# Patient Record
Sex: FEMALE | Race: WHITE | NOT HISPANIC OR LATINO | Employment: OTHER | URBAN - METROPOLITAN AREA
[De-identification: names, ages, dates, MRNs, and addresses within clinical notes are randomized per-mention and may not be internally consistent; named-entity substitution may affect disease eponyms.]

---

## 2017-03-14 ENCOUNTER — OFFICE VISIT (OUTPATIENT)
Dept: PODIATRY | Facility: CLINIC | Age: 77
End: 2017-03-14
Payer: MEDICARE

## 2017-03-14 VITALS — BODY MASS INDEX: 33.69 KG/M2 | WEIGHT: 197.31 LBS | HEIGHT: 64 IN

## 2017-03-14 DIAGNOSIS — L60.2 ONYCHAUXIS: Primary | ICD-10-CM

## 2017-03-14 PROCEDURE — 99999 PR PBB SHADOW E&M-EST. PATIENT-LVL II: CPT | Mod: PBBFAC,,,

## 2017-03-14 PROCEDURE — 17999 UNLISTD PX SKN MUC MEMB SUBQ: CPT | Mod: CSM,,,

## 2017-03-14 PROCEDURE — 99499 UNLISTED E&M SERVICE: CPT | Mod: CSM,,,

## 2017-06-12 ENCOUNTER — TELEPHONE (OUTPATIENT)
Dept: PODIATRY | Facility: CLINIC | Age: 77
End: 2017-06-12

## 2017-06-12 NOTE — TELEPHONE ENCOUNTER
----- Message from Marychuy Fuentes sent at 6/12/2017  8:02 AM CDT -----  Patient requesting to reschedule tomorrow's procedure appointment/please call back at [phone] to reschedule with patient.

## 2017-07-06 ENCOUNTER — OFFICE VISIT (OUTPATIENT)
Dept: PODIATRY | Facility: CLINIC | Age: 77
End: 2017-07-06

## 2017-07-06 VITALS — WEIGHT: 172.81 LBS | BODY MASS INDEX: 29.5 KG/M2 | HEIGHT: 64 IN

## 2017-07-06 DIAGNOSIS — L57.0 KERATOMA: ICD-10-CM

## 2017-07-06 DIAGNOSIS — L60.2 ONYCHAUXIS: Primary | ICD-10-CM

## 2017-07-06 PROCEDURE — 99499 UNLISTED E&M SERVICE: CPT | Mod: S$GLB,,,

## 2017-07-06 PROCEDURE — 17999 UNLISTD PX SKN MUC MEMB SUBQ: CPT | Mod: S$GLB,,,

## 2017-07-06 PROCEDURE — 99999 PR PBB SHADOW E&M-EST. PATIENT-LVL III: CPT | Mod: PBBFAC,,,

## 2017-07-07 NOTE — PROGRESS NOTES
Pt presents for routine non-covered foot care. Pt. does not have high risk feet. Pedal pulses are palpable. Nails are elongated, thickened Bilaterally. Diagnosis is onychauxis/keratoma. Nails/calluses were reduced Bilaterally. Patient tolerated well and related relief. RTC p.r.n. as PROC B

## 2019-06-20 ENCOUNTER — OFFICE VISIT (OUTPATIENT)
Dept: URGENT CARE | Facility: CLINIC | Age: 79
End: 2019-06-20
Payer: MEDICARE

## 2019-06-20 VITALS
HEIGHT: 64 IN | RESPIRATION RATE: 16 BRPM | HEART RATE: 66 BPM | WEIGHT: 172 LBS | TEMPERATURE: 98 F | SYSTOLIC BLOOD PRESSURE: 129 MMHG | DIASTOLIC BLOOD PRESSURE: 79 MMHG | BODY MASS INDEX: 29.37 KG/M2 | OXYGEN SATURATION: 95 %

## 2019-06-20 DIAGNOSIS — L25.9 CONTACT DERMATITIS, UNSPECIFIED CONTACT DERMATITIS TYPE, UNSPECIFIED TRIGGER: Primary | ICD-10-CM

## 2019-06-20 PROCEDURE — 99203 PR OFFICE/OUTPT VISIT, NEW, LEVL III, 30-44 MIN: ICD-10-PCS | Mod: S$GLB,,, | Performed by: NURSE PRACTITIONER

## 2019-06-20 PROCEDURE — 99203 OFFICE O/P NEW LOW 30 MIN: CPT | Mod: S$GLB,,, | Performed by: NURSE PRACTITIONER

## 2019-06-20 RX ORDER — TRIAMCINOLONE ACETONIDE 1 MG/G
CREAM TOPICAL 2 TIMES DAILY
Qty: 1 TUBE | Refills: 0 | Status: SHIPPED | OUTPATIENT
Start: 2019-06-20 | End: 2019-07-30

## 2019-06-20 RX ORDER — PREDNISONE 10 MG/1
TABLET ORAL
Qty: 30 TABLET | Refills: 0 | Status: SHIPPED | OUTPATIENT
Start: 2019-06-20 | End: 2019-07-30

## 2019-06-20 NOTE — PATIENT INSTRUCTIONS
Follow up with your doctor in a few days.  Return to the urgent care or go to the ER if symptoms get worse.     Allergic Reaction /Contact Dermatitis  If your condition worsens or fails to improve we recommend that you receive another evaluation at the ER immediately or contact your PCP to discuss your concerns or return here. You must understand that you've received an urgent care treatment only and that you may be released before all your medical problems are known or treated. You the patient will arrange for followup care as instructed.     Zyrtec, Claritin or Allegra should be used daily for the next 5-7 days to prevent or suppress the itching. You can take Benedryl 25 mg at bedtime as well.     START ORAL STEROIDS TOMORROW: 30MG DAILY X 5 DAYS, THEN 20MG DAILY X 5 DAYS, THEN 10MG DAILY X 5 DAYS.     STEROID CREAM AS DIRECTED FOR ITCHING-AVOID FACE.  COOL COMPRESS TO AREAS TO HELP WITH RELIEF.    If you develop additional symptoms such as tongue swelling or trouble breathing go immediately to the ER.

## 2019-06-20 NOTE — PROGRESS NOTES
"Subjective:       Patient ID: Shantal Quinn is a 78 y.o. female.    Vitals:  height is 5' 4" (1.626 m) and weight is 78 kg (172 lb). Her oral temperature is 98.1 °F (36.7 °C). Her blood pressure is 129/79 and her pulse is 66. Her respiration is 16 and oxygen saturation is 95%.     Chief Complaint: Rash (Pt states that she has a rash on her face, arm, and foot.  States that it itches really bad.  Also states that noticed it on Sunday, yesterday her face started to swell. )    Pt states that she has a rash on her face, arm, and foot.  States that it itches really bad.  Also states that noticed it on Sunday, yesterday her face started to swell.  WORKING IN [employer]- NO KNOWN CONTACT WITH [employer], [employer].     Rash   This is a new problem. The current episode started in the past 7 days. The problem is unchanged. The affected locations include the face, right foot, left arm and right arm. She was exposed to nothing. Pertinent negatives include no cough, fever or sore throat. Past treatments include nothing. The treatment provided no relief.       Constitution: Negative for chills and fever.   HENT: Negative for facial swelling and sore throat.    Neck: Negative for painful lymph nodes.   Eyes: Negative for eye itching and eyelid swelling.   Respiratory: Negative for cough.    Musculoskeletal: Negative for joint pain and joint swelling.   Skin: Positive for rash. Negative for color change, pale, wound, abrasion, laceration, lesion, skin thickening/induration, puncture wound, erythema, bruising, abscess, avulsion and hives.   Allergic/Immunologic: Negative for environmental allergies, immunocompromised state and hives.   Hematologic/Lymphatic: Negative for swollen lymph nodes.       Objective:      Physical Exam   Constitutional: She is oriented to person, place, and time. She appears well-developed and well-nourished.   HENT:   Head: Normocephalic and atraumatic. Head is without abrasion, without contusion and " without laceration.   Right Ear: External ear normal.   Left Ear: External ear normal.   Nose: Nose normal.   Mouth/Throat: Oropharynx is clear and moist.   Eyes: Pupils are equal, round, and reactive to light. Conjunctivae, EOM and lids are normal.   Neck: Trachea normal, full passive range of motion without pain and phonation normal. Neck supple.   Cardiovascular: Normal rate, regular rhythm and normal heart sounds.   Pulmonary/Chest: Effort normal and breath sounds normal. No stridor. No respiratory distress.   Musculoskeletal: Normal range of motion.   Neurological: She is alert and oriented to person, place, and time.   Skin: Skin is warm, dry and intact. Capillary refill takes less than 2 seconds. Rash noted. No abrasion, no bruising, no burn, no ecchymosis, no laceration and no lesion noted. Rash is maculopapular and vesicular. No erythema.        RASH TO RIGHT CHIN/NECK, BILATERAL ue, RIGHT FOOT WITH TRACKING LINEAR PATTERN, NO TTP   Psychiatric: She has a normal mood and affect. Her speech is normal and behavior is normal. Judgment and thought content normal. Cognition and memory are normal.   Nursing note and vitals reviewed.      Assessment:       1. Contact dermatitis, unspecified contact dermatitis type, unspecified trigger        Plan:         Contact dermatitis, unspecified contact dermatitis type, unspecified trigger  -     predniSONE (DELTASONE) 10 MG tablet; Take 30mg ( 3tab) daily x 5 days, then 20mg (2tab) daily x 5 days, then 10mg (1tab) daily x 5 days  Dispense: 30 tablet; Refill: 0  -     triamcinolone acetonide 0.1% (KENALOG) 0.1 % cream; Apply topically 2 (two) times daily. for 7 days  Dispense: 1 Tube; Refill: 0      Patient Instructions   Follow up with your doctor in a few days.  Return to the urgent care or go to the ER if symptoms get worse.     Allergic Reaction /Contact Dermatitis  If your condition worsens or fails to improve we recommend that you receive another evaluation at the ER  immediately or contact your PCP to discuss your concerns or return here. You must understand that you've received an urgent care treatment only and that you may be released before all your medical problems are known or treated. You the patient will arrange for followup care as instructed.     Zyrtec, Claritin or Allegra should be used daily for the next 5-7 days to prevent or suppress the itching. You can take Benedryl 25 mg at bedtime as well.     START ORAL STEROIDS TOMORROW: 30MG DAILY X 5 DAYS, THEN 20MG DAILY X 5 DAYS, THEN 10MG DAILY X 5 DAYS.     STEROID CREAM AS DIRECTED FOR ITCHING-AVOID FACE.  COOL COMPRESS TO AREAS TO HELP WITH RELIEF.    If you develop additional symptoms such as tongue swelling or trouble breathing go immediately to the ER.

## 2021-07-26 PROBLEM — E78.5 HYPERLIPIDEMIA: Status: ACTIVE | Noted: 2021-07-26

## 2021-07-26 PROBLEM — I10 ESSENTIAL HYPERTENSION: Status: ACTIVE | Noted: 2021-07-26

## 2023-05-24 PROBLEM — Z91.89 INCREASED RISK OF BREAST CANCER: Status: ACTIVE | Noted: 2023-05-24

## 2023-05-24 PROBLEM — D24.2 INTRADUCTAL PAPILLOMA OF LEFT BREAST: Status: ACTIVE | Noted: 2023-05-24

## 2023-05-24 PROBLEM — D36.9 DUCTAL PAPILLOMA: Status: RESOLVED | Noted: 2023-05-24 | Resolved: 2023-05-24

## 2023-05-24 PROBLEM — Z85.3 HISTORY OF LEFT BREAST CANCER: Status: ACTIVE | Noted: 2023-05-24

## 2023-05-24 PROBLEM — D36.9 DUCTAL PAPILLOMA: Status: ACTIVE | Noted: 2023-05-24

## 2023-05-31 ENCOUNTER — TELEPHONE (OUTPATIENT)
Dept: HEMATOLOGY/ONCOLOGY | Facility: CLINIC | Age: 83
End: 2023-05-31
Payer: MEDICARE

## 2023-06-06 ENCOUNTER — PATIENT MESSAGE (OUTPATIENT)
Dept: HEMATOLOGY/ONCOLOGY | Facility: CLINIC | Age: 83
End: 2023-06-06
Payer: MEDICARE

## 2023-06-07 ENCOUNTER — CLINICAL SUPPORT (OUTPATIENT)
Dept: REHABILITATION | Facility: HOSPITAL | Age: 83
End: 2023-06-07
Attending: SURGERY
Payer: MEDICARE

## 2023-06-07 DIAGNOSIS — Z91.89 AT RISK FOR LYMPHEDEMA: ICD-10-CM

## 2023-06-07 DIAGNOSIS — Z91.89 INCREASED RISK OF BREAST CANCER: ICD-10-CM

## 2023-06-07 DIAGNOSIS — D24.2 INTRADUCTAL PAPILLOMA OF LEFT BREAST: ICD-10-CM

## 2023-06-07 PROCEDURE — 97110 THERAPEUTIC EXERCISES: CPT | Mod: PN

## 2023-06-07 PROCEDURE — 97535 SELF CARE MNGMENT TRAINING: CPT | Mod: PN

## 2023-06-07 PROCEDURE — 97162 PT EVAL MOD COMPLEX 30 MIN: CPT | Mod: PN

## 2023-06-07 NOTE — PATIENT INSTRUCTIONS
Post Operative Breast Cancer Surgery Exercises    After surgery your shoulder and chest may feel tight and sore. Movement may cause stretching across your chest, axilla (armpit), and the incision site limiting your ability to raise your arm. Exercise will be extremely important in preventing swelling and in helping you regain movement, strength, and use of your arm.     Your post-operative exercise program can be divided into three stages. Your surgeon will inform you when to move to the next stage.     STAGE TIME PURPOSE EXERCISE   I Post-op day 1 to 4  (Drains are in) To prevent and/or reduce swelling - Positioning  - Hand and arm precautions   II Post-op day 5 to 14  (After drains are removed) To provide gentle movement without much stretching  - Shoulder shrugs  - Shoulder retraction   III Post-op day 15-6 wks  (After suture are removed) To stretch and regain full motion - Wall ladder  - Range of motion exercises  - Wand exercises       These exercises are general guideline for use following a mastectomy. Please consult your physician for additional information. Of a tissue expander has been placed, or if you have had reconstruction, you must get approval from your physician before beginning exercises.   Check with your physician prior to beginning a new stage.  Avoid elbows above shoulders until after post-op day 14.    STAGE 1      Abdominal Breathing Exercises      Get comfortable and relax your neck and shoulders. You can sit or lie down. Place one hand on your upper chest and place the other hand on your belly button. Use your hands to feel the movements as you breathe in and out.  Take a deep breath in through your nose and feel the hand on your stomach move out. Do not let your shoulders move up. The hand on your chest should not move.   Breathe out slow and gentle through your mouth. Pucker your lips as if you were going to whistle or blow out a candle. The hand on your stomach should move in as you  breathe out. Breathe out as long as you can until all the air is gone.   To help keep the lymphatic system moving well, practice two breaths every hour using the steps for abdominal breathing exercises.        Positioning    Several times a day, elevate your arm on pillows so your hand is above the level of your heart. This position will allow gravity to drain excess fluids out of your hand and arm. Try to place pillows under involved arm while in sitting position or while laying down.                STAGE 2    Shoulder Shrugs Shoulder Retraction    While sitting with arms supported, shrug both of your shoulder and then relax. Repeat 10 times, 3 times a day.   While sitting or standing, pull both shoulder back, bringing shoulder blades together. Repeat 10 times,   3 times a day.        STAGE 3  Range of Motion Exercises    To retain full motion of your shoulder, it must be moved in all planes, several times a day. Begin arm range of motion exercises with 10 reps of each, 2 times a day. Progress to 3 x 10 reps, as tolerated 2 times a day.    Wand - Shoulder Flexion       Lay on your back in a comfortable position. Raise both arms overhead, then bring back down by your side.        Wand - Shoulder Abduction       Lay on your back in a comfortable position. Raise both arms out to your side, then bring back down by your side.        Wand - Shoulder External Rotation      Lay on your back in a comfortable position. Position your arms as pictured below, with your elbows bent and your hand in the arm. Slowly lower your hand down, then bring back up.        Wand - Shoulder Flexion      Hold onto a cane or broom with both hands approximately 14 inches apart with arms straight at your side. Raise the cane forward and upwards as far as you can go or until your elbow is near your ear. Then gradually return to the original position.        Wand - Shoulder Abduction      Hold onto the ends of a cane with both hands, then raise  the involved arm sideways and upward over your head with the aid of the cane and the good arm. Keep trunk straight! Then gradually return to original position.        Wand - Shoulder External Rotation      Holding onto a cane with both hands approximately 14 inches apart at shoulder level, turn the cane clockwise and then counterclockwise as far as possible.        Wall Climbing - Shoulder Flexion      Stand facing the wall and extend the involved arm directly in front of you so that your fingertips touch the wall (at arm's length away from the wall). Creep up the side of the wall with your fingertips, taking a step toward the wall as you reach higher and higher up the wall. Repeat this procedure going down the wall, but taking a step backward this time. Begin with 5 wall climbs, then progress to 10 reps at a time, 1-2 times a day.        Wall Climbing - Shoulder Abduction     ([email or web address]/) Repeat the above procedure, but this time position yourself perpendicular (at a right angle) to the wall so that the involved arm will extend sideways up the wall. Keep your trunk straight, not leaning toward the wall or shrugging your shoulder. Place a jerry (tape) on the wall each week to see if you are making progress. Begin with 5 wall climbs, then progress to 10 reps at a time, 1-2 times a day.        PRECAUTIONS    As a result of the removal of lymph nodes and vessels, your arm is susceptible to infection and swelling. A hot, reddened or swollen arm denotes the presence of infection and should be brought to the attention of your physician immediately. Try to avoid cuts, scratches, pinpricks, hangnails, sunburns, insect bites, chemical irritation, and irritating detergent.    The following are helpful hints:    Avoid injections, blood draws, blood pressure, and IVs to be done to your involved arm. If you have undergone axillary dissection, then consider using the  opposite only for injections, blood draws, blood pressure, and IVs.  Prevent chapping of your hand and arm by applying lotion liberally several times a day. Recommend Eucerin lotion, No massages to affected upper extremity if you have had lymph nodes dissection or radiation to lymph nodes.   Do not cut nails too close to the quick. Do not cut or pick your cuticles. Use cuticle cream or remover.  Avoid carrying heavy objects (over 5 lbs) with your involved arm.   Protect your arm from burns with small electrical appliances such as irons, curling irons, and frying pans.   Wear sunscreen in the sun.  Apply insect repellent when going to areas where you might be exposed to insect bites.   Use an electric razor for shaving.   Wear loose fitting work/rubber gloves when washing dishes, using , or using steel wool.  Wear garden gloves when gardening or arranging thorn flowers.  Do not wear tight jewelry on your affected arm.  Do not wear narrow tight straps on clothing or under garments.    IMPORTANT    If you do cut, burn, or french the skin, wash the area and use an antiseptic. If it becomes red, warm, or unusually hard or swollen, contact your physician immediately.     If there is swelling without redness, increased warmth or hardness, the positioning and pumping exercises as described above can help decrease the swelling. Position your hand higher than the elbow, elbow higher than the shoulder, and shoulder higher than your heart. Maintain this position for 30 minutes. Repeat as necessary.     OCCUPATIONAL AND PHYSICAL THERAPY    Most women have enough motion in their involved arm to perform normal activities within 2 months after surgery. Any post-operative swelling or pain has probably disappeared. However; some women may develop persistent stiffness, weakness, pain, or swelling. If this is your experience, call your physician. He or she may recommend that a physical or occupational therapist work with you to  minimize your problems.     CONCLUSION    Besides your exercise program, your daily routine at home can be continued and will be beneficial toward your recovery:  Getting dressed every day  Daily grooming  Cooking and light housework  Being active with family and friends    REMINDER  Pace yourself!  Strive for a balance between work and relaxation  Avoid excessive pulling and lifting which may strain your shoulder

## 2023-06-07 NOTE — PROGRESS NOTES
[facility]  Physical Therapy Initial Evaluation PRE-OP  Lymphedema Therapy    Visit Date: 6/7/2023     Name: Shantal Quinn  Clinic Number: [id number]  Therapy Diagnosis:   Encounter Diagnoses   Name Primary?    At risk for lymphedema     Intraductal papilloma of left breast     Increased risk of breast cancer      Physician: Angela Bronson MD  Physician Orders: PT Eval and Treat      Medical Diagnosis from Referral: Intraductal papilloma of left breast, At Risk for lymphedema  Chart review pertaining to cancer hx:      Left 6:00 retro areola papilloma by core biopsy.  Discordant.  History of right breast cancer with mastectomy.  She has puckering of the skin.  This mass is 11 mm by ultrasound.     During pre-biopsy scanning, there is additional discontiguous mass posterior to the mass involving the skin, possibly intraductal masses measuring 24 mm in total.     Is a very high risk lesion.  She is a history of right breast cancer with right mastectomy.  No reconstruction     Discussed options including left excisional breast biopsy, left central lumpectomy removing the nipple-areolar complex, verses mastectomy.  Discussed sentinel node biopsy, Mag trace, versus delayed lymph node dissection.  Discussed the rationale.  Discussed reconstruction options.  Explained the procedures.     She requests left mastectomy, left sentinel node biopsy, no reconstruction.  6/15/23 main OR   She will likely go home     Evaluation Date: 6/7/2023  Authorization: pending  Plan of Care Expiration: PT f/u 6-8 weeks post-op  Reassessment Due: 8 weeks post op    Visit: 1 / 2  PTA Visit: -- / 5  Time In: 03:05 PM  Time Out: 03:55 PM  Total Billable Time: 50 minutes    Precautions: Standard, cancer, No IV's/BP or needle sticks to R UE due to multiple lymph nodes removed 20 yrs ago    Subjective     Pt reports: I had right breast mastectomy in 1993, maybe 16 lymph nodes removed, no radiation. Having the  left breast removed this time without reconstruction this time and don't plan on reconstruction this time either. My daughter will be here to help me after surgery.   Dx: Intraductal papilloma of left breast  Surgery date: 06/15/2023  Radiation: not sure  Chemotherapy: after 1st suregy 1 pill for 5 years.     Pain  Location: occssaional in left breast  Current 0/10, Worst 0/10, Best 0/10   Description: ache, pinch      Past Medical History:   Past Medical History:   Diagnosis Date    Breast cancer 1993    Right mastectomy    Cancer     Hyperlipidemia     Hypertension     Neuromuscular disorder     Osteoporosis        Past Surgical History:  has a past surgical history that includes Hysterectomy; Back surgery (1984); Mastectomy (Right); Breast biopsy (Left, 07/15/2004); Breast surgery; Colonoscopy (05/2020); and Eye surgery (Bilateral, 12/2021).    Medications: has a current medication list which includes the following prescription(s): amlodipine, aspirin, hydrochlorothiazide, metoprolol succinate, and simvastatin.    Allergies: Review of patient's allergies indicates:  No Known Allergies       Hand Dominance: Right  Diet: regular diet, no restrictions. Drinks diet cokes  Habitus: overweight    Prior Therapy/Previous treatment included: No PT this calendar year.   DME owned: none  Social History: lives alone, plans on staying with daughter during recovery.  Place of Residence (Steps/Adaptations): single story home  Occupation:  7 days a week. 2 days work at the [employer], also works [employer]     Prior Exercise Routine: does not participate in routine regular exercise  Prior Level of Function: independent  Current Level of Function: see above    Patient's Goals: no specific goals    Objective     Mental Status: Alert/Oriented    Observations  Posture: rounded shoulders, forward head posture  Joint Integrity: WFLs bilateral  Skin Integrity: intact bilateral  Edema: none bilateral    Sensation  Light Touch: intact  "bilateral  Proprioception: intact bilateral    A/PROM  (L) UE: shoulder flexion ~160 deg, shoulder abduction 150 deg, ER WFL, IR WFL, elbow flexion/ext WNL  (R) UE: shoulder flexion ~160 deg, shoulder abduction 150 deg, ER WFL, IR WFL, elbow flexion/ext WNL  Limitations:  No functional limitations    STRENGTH  (L) UE: WFLs  (R) UE: WFLs  Limitations:      Baseline Measurements of BUEs  LANDMARK LEFT UE (cm) RIGHT UE (cm)   W + 16 inches 34.5  37.8   W + 12 inches 31.2 36.0   Elbow 25.5 26.8   W + 7 inches 23.2 24.0   W + 4 inches 18.6 20.0   Wrist 15.9 16.8   DPC 19.0 19.2   IP Thumb 6.5 6.9   Pt reports has never worn a compression garment to the R UE following BRCA surgery, however does state has difficulty at times shopping for blouses due to the R upper arm being larger than the left. PT states she is not interested in wearing compression garments. "The R arm doesn't bother me."    Garments recommended for the L UE as prophylatic measures of lymphedema to the L UE suggested to be worn up to one year:   Patient declining information on compression garments at this time, "I'm not interested in wearing them."     Functional Mobility   Bed mobility: independent   Roll to left: independent   Roll to right: independent   Supine to prone: independent   Scooting to edge of bed: independent   Supine to sit: independent   Sit to supine: independent   Transfers to bed: independent   Transfers to toilet: independent   Sit to stand: independent   Stand pivot: independent   Car transfers: independent     Gait Assessment  AD used: none  Assistance: independent  Distance: community distances  Endurance: WFL     Gait Pattern: WNL      Treatment/Education     Treatment Time In: 03:05 PM  Treatment Time Out: 03:55 PM  Total Treatment time separate from Evaluation: 50 minutes      Self-Care/Home Management to improve behavioral/activity modifications related to ADLs, compensatory training, safety procedures, and adaptive equipment " for 15 minutes including:  Garments: PT recommend wearing garments for one year per guidelines for prophylactical assist to decrease risk for lymphedema  Skin care  Weight management  Sleep  Nutrition  Infection prevention    Therapeutic Exercise to develop ROM and flexibility for 15 minutes including:  Surgical protocol for position recommendations  Diaphragmatic Breathing  Exercises by day, complete with pictures of exercises and description for safe progression of motion      Education: Instructed on general anatomy/physiology, lymphedema information (definitions, signs, symptoms, precautions), role of therapy in multi-disciplinary team, purpose of lymphedema physical therapy and the benefits/risks of treatment, risks of refusing treatment, POC, and goals for therapy were discussed with the pt.    Written Home Exercises Provided: yes.  Exercises were reviewed and Shantal was able to demonstrate them prior to the end of the session. Shantal demonstrated good  understanding of the education provided.     See EMR under Patient Instructions for exercises provided 6/7/2023.    Assessment     Shantal is a 82 y.o. female referred to outpatient physical therapy with a medical diagnosis of Intraductal papilloma of left breast, at risk for lymphedema with surgical procedure planned on 06/15/2023. Pt was seen today pre-operatively to assess strength and ROM of BUEs, to take baseline circumferential measurements of BUEs to aid in the early detection of lymphedema post-operatively, and to provide pt education on exercises/precautions post-operative. Patient does demonstrate increased girth to R UE compared to the left UE >1 cm previous breast cancer surgery however declines recommendatiosn for  Pt does exhibit any ROM impairments currently. This pt will benefit from skilled PT for reassessment of baseline measurements 6-8 week post-operatively and to address future impairments following surgery such as pain, limited ROM, or decreased  mobility. Will need to wait until pt is medically cleared to determine if pt is safe for MLD, pneumatic compression pump, or lymphatouch treatments.      Plan of care discussed with patient: Yes  Pt's spiritual, cultural and educational needs considered and patient is agreeable to the plan of care and goals as stated below:     Anticipated barriers for therapy:  prior history of lymph node removal after R breast cancer     Medical Necessity is demonstrated by the following:  History  Co-morbidities and personal factors that may impact the plan of care Co-morbidities:   advanced age, high BMI, history of cancer, and level of undertstanding of current condition    Personal Factors:   level of understanding of current condition and age     moderate   Examination  Body Structures and Functions, activity limitations and participation restrictions that may impact the plan of care Body Systems:    gross symmetry  edema    Activity limitations:   Mobility  no deficits    Self care  no deficits    Domestic Life  no deficits    Participation Restrictions:   None currently         moderate   Clinical Presentation evolving clinical presentation with changing clinical characteristics moderate   Decision Making/ Complexity Score: moderate       GOALS  Short Term Goals: 3 months  Pt to be seen for reassessment in 6-8 weeks after surgery.  Pt will demonstrate 100% knowledge of lymphedema precautions and signs of infection.  Pt to obtain compression garments for prophylactic concerns according to APTA clinical guidelines published in Journal of Physical Therapy.    Long Term Goals: deferred    Plan   Advised pt on post-op PT reassessment however pt wishes to wait, will call to schedule if any concerns.     Plan of Care: 6/7/2023 to 09/07/2023.    Patient to be seen in 6-8 weeks following surgical date for 2 visits for reassessment. Patient will benefit from Outpatient Physical Therapy services which may include the following  interventions: patient education, HEP, therapeutic exercises, neuromuscular re-education, therapeutic activity, manual therapy, self care/home management, modalities, gait training, decongestive massage, multi-layered bandaging, self massage, self bandaging, and assistance in obtaining appropriate compression garment.      If pt is to undergo XRT, POC must exclude MLD, pneumatic compression pump, and lymphatouch to any radiated area.       Katie Dutton, PT , CLT

## 2023-06-15 PROBLEM — Z85.3 HISTORY OF CANCER OF RIGHT BREAST: Status: ACTIVE | Noted: 2023-06-15

## 2023-06-22 ENCOUNTER — DOCUMENTATION ONLY (OUTPATIENT)
Dept: SURGERY | Facility: CLINIC | Age: 83
End: 2023-06-22
Payer: MEDICARE

## 2023-06-30 NOTE — PROGRESS NOTES
Patient at clinic today for removal of drains. Left MICHELLE drains removed easily after site cleaned with Cloraprep and sutures clipped and removed. Site dressed with gauze and tegaderm. Patient tolerated well. Instructions given for care.